# Patient Record
Sex: FEMALE | Race: WHITE | Employment: OTHER | URBAN - METROPOLITAN AREA
[De-identification: names, ages, dates, MRNs, and addresses within clinical notes are randomized per-mention and may not be internally consistent; named-entity substitution may affect disease eponyms.]

---

## 2017-12-06 ENCOUNTER — OFFICE VISIT (OUTPATIENT)
Dept: PULMONOLOGY | Age: 52
End: 2017-12-06

## 2017-12-06 VITALS
DIASTOLIC BLOOD PRESSURE: 86 MMHG | HEIGHT: 67 IN | SYSTOLIC BLOOD PRESSURE: 129 MMHG | RESPIRATION RATE: 18 BRPM | WEIGHT: 148 LBS | HEART RATE: 67 BPM | OXYGEN SATURATION: 99 % | BODY MASS INDEX: 23.23 KG/M2

## 2017-12-06 DIAGNOSIS — R06.02 SOB (SHORTNESS OF BREATH): Primary | ICD-10-CM

## 2017-12-06 DIAGNOSIS — R91.1 PULMONARY NODULE: ICD-10-CM

## 2017-12-06 DIAGNOSIS — J45.50 SEVERE PERSISTENT ASTHMA WITHOUT COMPLICATION: ICD-10-CM

## 2017-12-06 DIAGNOSIS — J44.9 COPD WITH CHRONIC BRONCHITIS (HCC): ICD-10-CM

## 2017-12-06 PROBLEM — J44.89 COPD WITH CHRONIC BRONCHITIS: Status: ACTIVE | Noted: 2017-12-06

## 2017-12-06 PROCEDURE — G8484 FLU IMMUNIZE NO ADMIN: HCPCS | Performed by: INTERNAL MEDICINE

## 2017-12-06 PROCEDURE — G8427 DOCREV CUR MEDS BY ELIG CLIN: HCPCS | Performed by: INTERNAL MEDICINE

## 2017-12-06 PROCEDURE — 3023F SPIROM DOC REV: CPT | Performed by: INTERNAL MEDICINE

## 2017-12-06 PROCEDURE — 3014F SCREEN MAMMO DOC REV: CPT | Performed by: INTERNAL MEDICINE

## 2017-12-06 PROCEDURE — 99204 OFFICE O/P NEW MOD 45 MIN: CPT | Performed by: INTERNAL MEDICINE

## 2017-12-06 PROCEDURE — G8926 SPIRO NO PERF OR DOC: HCPCS | Performed by: INTERNAL MEDICINE

## 2017-12-06 PROCEDURE — 4004F PT TOBACCO SCREEN RCVD TLK: CPT | Performed by: INTERNAL MEDICINE

## 2017-12-06 PROCEDURE — 3017F COLORECTAL CA SCREEN DOC REV: CPT | Performed by: INTERNAL MEDICINE

## 2017-12-06 PROCEDURE — G8420 CALC BMI NORM PARAMETERS: HCPCS | Performed by: INTERNAL MEDICINE

## 2017-12-06 NOTE — PROGRESS NOTES
Shelbi Farias is 47 y.o. female here for Pulmonary Medicine consultation referred by Dr. Cher Prince for evaluation of COPD and SOB. Chief Complaint   Patient presents with    Abnormal CT     NPV - pt had an abnormal CT which showed pulmonary nodule    Shortness of Breath     shortness of breath with exertion since September     Patient was seen 5 years ago for similar evaluation  Symptoms include dry cough and dyspnea on exertion. Symptoms onset gradual over the last few months  Severity described moderate  The course of symptoms gradually worsening since that time. The symptoms improved with rest  The dyspnea occurs with moderate activity. Patient denies hemoptysis, fever, chills, diaphoresis or chest pain . Aggravating factors include exertion, exercise and climbing stairs. The patient reports an exercise tolerance of approximately > 2 blocks on the flat and 2 flights of stairs, limited primarily by dyspnea. Currently the patient admits to SOB worse than baseline. The patient describes normal SOB as dyspnea with exertion. At this time, supplemental 02 is not required    With regard to inhaled therapy, the patient admits to compliance with prescribed inhaled therapy including Symbicort and albuterol    Patient reported hx of asthma as a child requiring hospitalizations multiple times. She grown out of it and she did not have any major respiratory problems while she lived in [place]. She moved to [place] 11 years ago and she was hospitalized twice since for COPD exacerbations. She reported at least 5-6 episodes of respiratory exacerbations since her last visit with me  Her last visit in September was the most severe according to patient and she did have a CTA done  EXAMINATION:   CTA OF THE CHEST 9/14/2017 2:54 pm       TECHNIQUE:   CTA of the chest was performed after the administration of intravenous   contrast.  Multiplanar reformatted images are provided for review.   MIP   images are provided for some air trapping      Past Medical History:   Diagnosis Date    Asthma     Back pain     INTERMITTENT    COPD (chronic obstructive pulmonary disease) (HCC)     Miscarriage     X5    Pneumonia     Pneumonia      Current Outpatient Prescriptions   Medication Sig Dispense Refill    escitalopram (LEXAPRO) 5 MG tablet Take 5 mg by mouth daily 1/2 tab day      buPROPion (WELLBUTRIN) 100 MG tablet Take 100 mg by mouth daily Unknown mg dose      ondansetron (ZOFRAN ODT) 4 MG disintegrating tablet Take 1-2 tablets by mouth every 12 hours as needed for Nausea May Sub regular tablet (non-ODT) if insurance does not cover ODT. 12 tablet 0    naproxen (NAPROSYN) 500 MG tablet Take 1 tablet by mouth 2 times daily for 20 doses 20 tablet 0    montelukast (SINGULAIR) 10 MG tablet Take 10 mg by mouth nightly.  montelukast (SINGULAIR) 10 MG tablet Take 1 tablet by mouth nightly. 30 tablet 0    mometasone (NASONEX) 50 MCG/ACT nasal spray 2 sprays by Nasal route daily.  Budesonide-Formoterol Fumarate (SYMBICORT) 160-4.5 MCG/ACT AERO Inhale 2 puffs into the lungs 2 times daily.  albuterol (PROVENTIL HFA;VENTOLIN HFA) 108 (90 BASE) MCG/ACT inhaler Inhale 2 puffs into the lungs every 6 hours as needed. No current facility-administered medications for this visit. Family History   Problem Relation Age of Onset    Hearing Loss Father      Social History     Social History    Marital status:      Spouse name: N/A    Number of children: N/A    Years of education: N/A     Occupational History    Not on file.      Social History Main Topics    Smoking status: Current Some Day Smoker     Packs/day: 1.00     Years: 35.00     Types: Cigarettes    Smokeless tobacco: Never Used      Comment: started to smoke at 13 / smoked up to 2 p.p.d / now using vapors and cigarettes off and on     Alcohol use Yes      Comment: occ    Drug use: No    Sexual activity: Yes     Partners: Male     Other Topics Concern    Not on file     Social History Narrative    No narrative on file         Review of Systems   Constitutional: Negative. Negative for fever, chills, diaphoresis, activity change, appetite change, fatigue and unexpected weight change. HENT: Negative. Negative for hearing loss, ear pain, nosebleeds, congestion, facial swelling, rhinorrhea, sneezing, neck pain, neck stiffness, postnasal drip, sinus pressure and ear discharge. Eyes: Negative. Negative for photophobia, pain, discharge, redness, itching and visual disturbance. Respiratory: As per HPI  Cardiovascular: Negative. Negative for chest pain, palpitations and leg swelling. Gastrointestinal: Negative. Negative for abdominal pain, blood in stool, abdominal distention and anal bleeding. Genitourinary: Negative. Negative for dysuria, urgency, frequency, hematuria, decreased urine volume, enuresis and difficulty urinating. Musculoskeletal: Negative. Negative for myalgias, back pain, joint swelling, arthralgias and gait problem. Skin: Negative. Negative for color change and pallor. Neurological: Negative. Negative for dizziness, tremors, seizures, syncope, facial asymmetry, speech difficulty, weakness, light-headedness, numbness and headaches. Hematological: Negative. Negative for adenopathy. Psychiatric/Behavioral: Negative. Negative for hallucinations, behavioral problems, confusion and agitation. The patient is not nervous/anxious and is not hyperactive. Blood pressure 129/86, pulse 67, resp. rate 18, height 5' 7\" (1.702 m), weight 148 lb (67.1 kg), SpO2 99 %, unknown if currently breastfeeding. Physical Exam   Constitutional: Oriented. Well-developed and well-nourished. No distress. HENT:   Head: Normocephalic and atraumatic. Mouth/Throat: Oropharynx is clear and moist. No oropharyngeal exudate. Eyes: Conjunctivae and extraocular motions are normal. Pupils are equal, round, and reactive to light.  Right eye exhibits no discharge. Left eye exhibits no discharge. Neck: Normal range of motion. Neck supple. No JVD present. Carotid bruit is not present. No rigidity. No tracheal deviation present. No thyromegaly present. Cardiovascular: Normal rate, regular rhythm, S1&S2 and intact distal pulses. Pulmonary/Chest: Effort normal and breath sounds normal. No stridor. No respiratory distress. Has no wheezes. Has no rhonchi. Has no rales. Exhibits no tenderness. Abdominal: Soft. Bowel sounds are normal. Exhibits no shifting dullness, no distension and no mass. No tenderness. Has no rebound and no guarding. Musculoskeletal: Normal range of motion. Exhibits no edema and no tenderness. Lymphadenopathy:     Has no cervical adenopathy. Has no axillary adenopathy. Neurological: Alert and oriented. Has normal reflexes. No cranial nerve deficit. Exhibits normal muscle tone. Coordination normal.   Skin: Skin is warm and dry. No rash noted. No erythema. Psychiatric: Has a normal mood and affect. Behavior is normal. Thought content normal.      Assessment:    1. SOB (shortness of breath)     2. Severe persistent asthma without complication     3. COPD with chronic bronchitis ([address]     4. Pulmonary nodule                  Plan:    1. I discussed with patient the above diagnosis in details and reviewed her recent ED visits and CAT scan of the chest  2. I reviewed her CAT scan imaging on the monitor and explained findings to the patient. We will repeat CT scan of the chest in 6 month  3. She was educated about inhalers use and we will continue Symbicort 160 twice daily, Albuterol as needed with Singulair. I think she is going required nebulizer treatment at home and we will go ahead and order that  4. Educated again about the need for smoking cessation, including vapors   5. I will order full PFT again, ANCA and IgE levels  6.  RTC in 1 month

## 2017-12-06 NOTE — COMMUNICATION BODY
Assessment:     Assessment:    1. SOB (shortness of breath)     2. Severe persistent asthma without complication     3. COPD with chronic bronchitis ([address]     4. Pulmonary nodule             Plan:        Plan:    1. I discussed with patient the above diagnosis in details and reviewed her recent ED visits and CAT scan of the chest  2. I reviewed her CAT scan imaging on the monitor and explained findings to the patient. We will repeat CT scan of the chest in 6 month  3. She was educated about inhalers use and we will continue Symbicort 160 twice daily, Albuterol as needed with Singulair. I think she is going required nebulizer treatment at home and we will go ahead and order that  4. Educated again about the need for smoking cessation, including vapors   5. I will order full PFT again, ANCA and IgE levels  6.  RTC in 1 month

## 2017-12-14 ENCOUNTER — HOSPITAL ENCOUNTER (OUTPATIENT)
Dept: PULMONOLOGY | Age: 52
Discharge: HOME | End: 2017-12-14
Attending: INTERNAL MEDICINE | Admitting: INTERNAL MEDICINE

## 2017-12-14 VITALS — OXYGEN SATURATION: 99 % | HEART RATE: 76 BPM | RESPIRATION RATE: 18 BRPM

## 2017-12-14 DIAGNOSIS — J45.50 SEVERE PERSISTENT ASTHMA, UNCOMPLICATED: ICD-10-CM

## 2017-12-15 NOTE — PROCEDURES
[facility]                      [facility], [address]                                PULMONARY FUNCTION    PATIENT NAME: Landon Cr                 :        1965  MED REC NO:   [id number]                          ROOM:  ACCOUNT NO:   [de-identified]                          ADMIT DATE: 2017  PROVIDER:     Radha Zelaya MD    DATE OF PROCEDURE:  2017    INDICATION:  Asthma. INTERPRETATION:  Spirometry attempts were acceptable and reproducible. FVC  was normal at 3.69 liters, 95% predicted and normal FEV1 of 2.77 liters,  90% predicted. FEV1/FVC ratio was normal.  Lung volumes showed normal  total lung capacity of 107% predicted. Diffusion capacity showed decreased  DLCO of 72% predicted. IMPRESSION:  Normal spirometry and lung volumes with mild decrease in  diffusion capacity.         J Carlos Fraga MD    D: 12/15/2017 8:09:28       T: 12/15/2017 8:10:10     [email or web address]  Job#: [id number]     Doc#: [id number]    CC:

## 2017-12-16 LAB
ANCA IFA: NORMAL
IGE: 130 KU/L

## 2018-01-17 ENCOUNTER — OFFICE VISIT (OUTPATIENT)
Dept: PULMONOLOGY | Age: 53
End: 2018-01-17

## 2018-01-17 ENCOUNTER — TELEPHONE (OUTPATIENT)
Dept: PULMONOLOGY | Age: 53
End: 2018-01-17

## 2018-01-17 VITALS
BODY MASS INDEX: 23.39 KG/M2 | HEART RATE: 83 BPM | DIASTOLIC BLOOD PRESSURE: 71 MMHG | HEIGHT: 67 IN | SYSTOLIC BLOOD PRESSURE: 107 MMHG | RESPIRATION RATE: 18 BRPM | WEIGHT: 149 LBS | OXYGEN SATURATION: 100 %

## 2018-01-17 DIAGNOSIS — R06.02 SOB (SHORTNESS OF BREATH): ICD-10-CM

## 2018-01-17 DIAGNOSIS — J44.9 COPD WITH ASTHMA (HCC): Primary | ICD-10-CM

## 2018-01-17 DIAGNOSIS — R91.1 PULMONARY NODULE: ICD-10-CM

## 2018-01-17 PROCEDURE — 99214 OFFICE O/P EST MOD 30 MIN: CPT | Performed by: INTERNAL MEDICINE

## 2018-01-17 RX ORDER — DOXYCYCLINE HYCLATE 100 MG
100 TABLET ORAL 2 TIMES DAILY
Qty: 20 TABLET | Refills: 0 | Status: SHIPPED | OUTPATIENT
Start: 2018-01-17 | End: 2018-01-17 | Stop reason: CLARIF

## 2018-01-17 RX ORDER — ALBUTEROL SULFATE 2.5 MG/3ML
2.5 SOLUTION RESPIRATORY (INHALATION) EVERY 6 HOURS PRN
Qty: 120 EACH | Refills: 3 | Status: SHIPPED | OUTPATIENT
Start: 2018-01-17

## 2018-01-17 NOTE — PROGRESS NOTES
Dose modulation, iterative reconstruction,   and/or weight based adjustment of the mA/kV was utilized to reduce the   radiation dose to as low as reasonably achievable.       COMPARISON:   04/03/2014       HISTORY:   ORDERING PHYSICIAN PROVIDED HISTORY: r/o PE   TECHNOLOGIST PROVIDED HISTORY:   Technologist Provided Reason for Exam: for several weeks, coughing up \"black,   red stuff\"   Acuity: Acute   Type of Encounter: Initial   Relevant Medical/Surgical History: copd, asthma, hx pneumonia       FINDINGS:   Pulmonary Arteries: Within the main, central most right, central most left   pulmonary arteries, no evidence of filling defect to suggest large central   pulmonary embolism.  Within the opacified branch vessels, no gross embolism   is seen.       Mediastinum: Within the thoracic aorta, no evidence of intraluminal flap. Aortic pulsation artifact is demonstrated.  Prominent superior pericardial   recess.  Thyroid is heterogeneous.  Bilateral breast implants.  No evidence   of axillary adenopathy.       Lungs/pleura: Emphysema is present.  3 mm right upper lobe pulmonary nodule   on series 2, image 50 is not significantly changed from prior.  Another on   image 90 is unchanged.  Mild bilateral symmetric posterior pleural thickening   is seen, for example on image 116 which is new as compared to prior.  No   evidence of pneumothorax.       Upper Abdomen: 5 mm low-density lesion within the left hepatic lobe is too   small to characterize, though similar to prior.       Soft Tissues/Bones: Degenerative change throughout the spine.           Impression   No gross findings of pulmonary embolism.       Mild nonspecific bilateral posterior pleural thickening.  Continued follow-up   may be obtained based upon clinical risk factors.           She continued to smoke since her last visit and she just switched to taper 6 months ago  She denies any exposure hx and has 2 dogs at home but no cats.   Repeat PFT showed no clear

## 2018-01-18 ENCOUNTER — TELEPHONE (OUTPATIENT)
Dept: PULMONOLOGY | Age: 53
End: 2018-01-18

## 2018-01-19 NOTE — TELEPHONE ENCOUNTER
I spoke to the pt about her Spiriva to tell her not to take it due to being on her Allergy list - pt stated that she could not remember what the reaction was so she decided to try it again - pt stated that so far she has not had any reaction to the Spiriva and she will continue to use it - if pt has a reaction she knows to stop and call the office or go the the ED if the reaction is severe

## 2018-03-07 ENCOUNTER — HOSPITAL ENCOUNTER (OUTPATIENT)
Dept: CT IMAGING | Age: 53
Discharge: HOME | End: 2018-03-07
Attending: INTERNAL MEDICINE | Admitting: INTERNAL MEDICINE

## 2018-03-07 DIAGNOSIS — R91.1 SOLITARY PULMONARY NODULE: ICD-10-CM

## 2018-03-07 DIAGNOSIS — R91.1 PULMONARY NODULE: ICD-10-CM

## 2018-03-15 ENCOUNTER — TELEPHONE (OUTPATIENT)
Dept: PULMONOLOGY | Age: 53
End: 2018-03-15

## 2018-03-15 NOTE — TELEPHONE ENCOUNTER
Pt called in requesting a call to go over the results of her CT Chest from 3/7.     Pt #  [phone]

## 2018-06-01 ENCOUNTER — TELEPHONE (OUTPATIENT)
Dept: PULMONOLOGY | Age: 53
End: 2018-06-01

## 2018-06-01 ENCOUNTER — OFFICE VISIT (OUTPATIENT)
Dept: PULMONOLOGY | Age: 53
End: 2018-06-01

## 2018-06-01 VITALS
BODY MASS INDEX: 21.82 KG/M2 | HEIGHT: 67 IN | HEART RATE: 70 BPM | DIASTOLIC BLOOD PRESSURE: 72 MMHG | RESPIRATION RATE: 18 BRPM | WEIGHT: 139 LBS | OXYGEN SATURATION: 99 % | SYSTOLIC BLOOD PRESSURE: 112 MMHG

## 2018-06-01 DIAGNOSIS — J44.9 COPD WITH ASTHMA (HCC): Primary | ICD-10-CM

## 2018-06-01 DIAGNOSIS — J44.9 ASTHMA WITH COPD (CHRONIC OBSTRUCTIVE PULMONARY DISEASE) (HCC): Primary | ICD-10-CM

## 2018-06-01 PROBLEM — J44.89 COPD WITH ASTHMA: Status: ACTIVE | Noted: 2018-06-01

## 2018-06-01 PROCEDURE — 99213 OFFICE O/P EST LOW 20 MIN: CPT | Performed by: INTERNAL MEDICINE

## 2018-06-08 ENCOUNTER — HOSPITAL ENCOUNTER (OUTPATIENT)
Dept: OTHER | Age: 53
Discharge: HOME | End: 2018-06-08
Attending: INTERNAL MEDICINE | Admitting: INTERNAL MEDICINE

## 2018-12-06 ENCOUNTER — TELEPHONE (OUTPATIENT)
Dept: PULMONOLOGY | Age: 53
End: 2018-12-06

## 2019-09-21 ENCOUNTER — APPOINTMENT (OUTPATIENT)
Dept: GENERAL RADIOLOGY | Age: 54
End: 2019-09-21
Payer: COMMERCIAL

## 2019-09-21 ENCOUNTER — HOSPITAL ENCOUNTER (EMERGENCY)
Age: 54
Discharge: HOME | End: 2019-09-21
Payer: COMMERCIAL

## 2019-09-21 VITALS
OXYGEN SATURATION: 96 % | RESPIRATION RATE: 17 BRPM | WEIGHT: 140 LBS | TEMPERATURE: 98.2 F | HEART RATE: 86 BPM | SYSTOLIC BLOOD PRESSURE: 110 MMHG | BODY MASS INDEX: 21.97 KG/M2 | HEIGHT: 67 IN | DIASTOLIC BLOOD PRESSURE: 68 MMHG

## 2019-09-21 DIAGNOSIS — Z77.098 CHEMICAL EXPOSURE: Primary | ICD-10-CM

## 2019-09-21 DIAGNOSIS — J45.901 EXACERBATION OF ASTHMA, UNSPECIFIED ASTHMA SEVERITY, UNSPECIFIED WHETHER PERSISTENT: ICD-10-CM

## 2019-09-21 PROCEDURE — 94640 AIRWAY INHALATION TREATMENT: CPT

## 2019-09-21 PROCEDURE — 93005 ELECTROCARDIOGRAM TRACING: CPT | Performed by: EMERGENCY MEDICINE

## 2019-09-21 PROCEDURE — 6360000002 HC RX W HCPCS: Performed by: PHYSICIAN ASSISTANT

## 2019-09-21 PROCEDURE — 71046 X-RAY EXAM CHEST 2 VIEWS: CPT

## 2019-09-21 PROCEDURE — 6370000000 HC RX 637 (ALT 250 FOR IP): Performed by: PHYSICIAN ASSISTANT

## 2019-09-21 PROCEDURE — 99284 EMERGENCY DEPT VISIT MOD MDM: CPT

## 2019-09-21 RX ORDER — CETIRIZINE HYDROCHLORIDE 10 MG/1
10 TABLET ORAL DAILY
COMMUNITY

## 2019-09-21 RX ORDER — PREDNISONE 20 MG/1
TABLET ORAL
Qty: 18 TABLET | Refills: 0 | Status: SHIPPED | OUTPATIENT
Start: 2019-09-21 | End: 2019-10-01

## 2019-09-21 RX ORDER — ALBUTEROL SULFATE 2.5 MG/3ML
5 SOLUTION RESPIRATORY (INHALATION) ONCE
Status: DISCONTINUED | OUTPATIENT
Start: 2019-09-21 | End: 2019-09-21

## 2019-09-21 RX ORDER — PREDNISONE 20 MG/1
60 TABLET ORAL ONCE
Status: COMPLETED | OUTPATIENT
Start: 2019-09-21 | End: 2019-09-21

## 2019-09-21 RX ORDER — IPRATROPIUM BROMIDE AND ALBUTEROL SULFATE 2.5; .5 MG/3ML; MG/3ML
1 SOLUTION RESPIRATORY (INHALATION) ONCE
Status: COMPLETED | OUTPATIENT
Start: 2019-09-21 | End: 2019-09-21

## 2019-09-21 RX ADMIN — IPRATROPIUM BROMIDE AND ALBUTEROL SULFATE 1 AMPULE: .5; 3 SOLUTION RESPIRATORY (INHALATION) at 21:06

## 2019-09-21 RX ADMIN — ALBUTEROL SULFATE 5 MG: 2.5 SOLUTION RESPIRATORY (INHALATION) at 21:06

## 2019-09-21 RX ADMIN — PREDNISONE 60 MG: 20 TABLET ORAL at 21:03

## 2019-09-21 ASSESSMENT — ENCOUNTER SYMPTOMS
WHEEZING: 1
COUGH: 1
NAUSEA: 0
CHEST TIGHTNESS: 1
ABDOMINAL PAIN: 0
RHINORRHEA: 0
SHORTNESS OF BREATH: 1
VOMITING: 0
DIARRHEA: 0

## 2019-09-21 ASSESSMENT — PAIN SCALES - GENERAL: PAINLEVEL_OUTOF10: 9

## 2019-09-22 LAB
EKG ATRIAL RATE: 88 BPM
EKG DIAGNOSIS: NORMAL
EKG P AXIS: 72 DEGREES
EKG P-R INTERVAL: 144 MS
EKG Q-T INTERVAL: 376 MS
EKG QRS DURATION: 72 MS
EKG QTC CALCULATION (BAZETT): 454 MS
EKG R AXIS: 64 DEGREES
EKG T AXIS: 22 DEGREES
EKG VENTRICULAR RATE: 88 BPM

## 2019-09-22 PROCEDURE — 93010 ELECTROCARDIOGRAM REPORT: CPT | Performed by: INTERNAL MEDICINE

## 2019-09-22 NOTE — ED PROVIDER NOTES
cardiopulmonary disease. Xr Chest Standard (2 Vw)    Result Date: 9/21/2019  EXAMINATION: TWO XRAY VIEWS OF THE CHEST 9/21/2019 8:21 pm COMPARISON: 09/02/2017 HISTORY: ORDERING SYSTEM PROVIDED HISTORY: shortness of breath TECHNOLOGIST PROVIDED HISTORY: Reason for exam:->shortness of breath Reason for Exam: feels like she has \"chemical pneumonia\". was recently in an apt where they sprayed for bed bugs and has felt fatigued and sob since then. also c/o l flank pain since Acuity: Acute Type of Exam: Initial FINDINGS: The heart and mediastinal structures are stable. The pulmonary vasculature is normal.  Lungs are clear. Calcified bilateral breast implants are redemonstrated. The bones are osteopenic. No acute cardiopulmonary disease. PROCEDURES   Unless otherwise noted below, none     Procedures    CRITICAL CARE TIME   N/A    CONSULTS:  None      EMERGENCY DEPARTMENT COURSE and DIFFERENTIAL DIAGNOSIS/MDM:   Vitals:    Vitals:    09/21/19 2104 09/21/19 2106 09/21/19 2108 09/21/19 2130   BP: 114/72   116/82   Pulse: 80   105   Resp: 16 19 12 23   Temp:       SpO2: 97%  97% 97%   Weight:       Height:           Patient was given thefollowing medications:  Medications   predniSONE (DELTASONE) tablet 60 mg (60 mg Oral Given 9/21/19 2103)   ipratropium-albuterol (DUONEB) nebulizer solution 1 ampule (1 ampule Inhalation Given 9/21/19 2106)   albuterol (PROVENTIL) nebulizer solution 5 mg (5 mg Nebulization Given 9/21/19 2106)       Patient presents for evaluation of cough shortness of breath and wheezing. History of asthma and COPD. On exam, patient is resting comfortably in no acute distress and nontoxic. She was initially slightly tachycardic and tachypneic but vitals otherwise stable and she is afebrile. Satting at 97 and 99% on room air. She does have expiratory wheezing and bronchospastic cough noted on exam with good aeration. Chest is nontender abdomen is benign.   HEENT exam is

## 2019-09-26 ENCOUNTER — HOSPITAL ENCOUNTER (EMERGENCY)
Age: 54
Discharge: HOME | End: 2019-09-26
Attending: EMERGENCY MEDICINE
Payer: COMMERCIAL

## 2019-09-26 ENCOUNTER — APPOINTMENT (OUTPATIENT)
Dept: CT IMAGING | Age: 54
End: 2019-09-26
Payer: COMMERCIAL

## 2019-09-26 VITALS
WEIGHT: 130 LBS | DIASTOLIC BLOOD PRESSURE: 82 MMHG | SYSTOLIC BLOOD PRESSURE: 146 MMHG | BODY MASS INDEX: 20.4 KG/M2 | TEMPERATURE: 99.8 F | OXYGEN SATURATION: 97 % | RESPIRATION RATE: 17 BRPM | HEIGHT: 67 IN | HEART RATE: 79 BPM

## 2019-09-26 DIAGNOSIS — J44.1 COPD EXACERBATION (HCC): Primary | ICD-10-CM

## 2019-09-26 DIAGNOSIS — R05.9 COUGH: ICD-10-CM

## 2019-09-26 LAB
A/G RATIO: 1.6 (ref 1.1–2.2)
ALBUMIN SERPL-MCNC: 4.4 G/DL (ref 3.4–5)
ALP BLD-CCNC: 49 U/L (ref 40–129)
ALT SERPL-CCNC: 13 U/L (ref 10–40)
ANION GAP SERPL CALCULATED.3IONS-SCNC: 11 MMOL/L (ref 3–16)
AST SERPL-CCNC: 13 U/L (ref 15–37)
BASOPHILS ABSOLUTE: 0 K/UL (ref 0–0.2)
BASOPHILS RELATIVE PERCENT: 0.2 %
BILIRUB SERPL-MCNC: 0.4 MG/DL (ref 0–1)
BUN BLDV-MCNC: 14 MG/DL (ref 7–20)
CALCIUM SERPL-MCNC: 9.7 MG/DL (ref 8.3–10.6)
CHLORIDE BLD-SCNC: 104 MMOL/L (ref 99–110)
CO2: 25 MMOL/L (ref 21–32)
CREAT SERPL-MCNC: <0.5 MG/DL (ref 0.6–1.1)
EOSINOPHILS ABSOLUTE: 0 K/UL (ref 0–0.6)
EOSINOPHILS RELATIVE PERCENT: 0.2 %
GFR AFRICAN AMERICAN: >60
GFR NON-AFRICAN AMERICAN: >60
GLOBULIN: 2.7 G/DL
GLUCOSE BLD-MCNC: 111 MG/DL (ref 70–99)
HCG QUALITATIVE: NEGATIVE
HCT VFR BLD CALC: 39.1 % (ref 36–48)
HEMOGLOBIN: 13.3 G/DL (ref 12–16)
LYMPHOCYTES ABSOLUTE: 1.5 K/UL (ref 1–5.1)
LYMPHOCYTES RELATIVE PERCENT: 15.2 %
MCH RBC QN AUTO: 33.8 PG (ref 26–34)
MCHC RBC AUTO-ENTMCNC: 34.2 G/DL (ref 31–36)
MCV RBC AUTO: 99 FL (ref 80–100)
MONOCYTES ABSOLUTE: 0.7 K/UL (ref 0–1.3)
MONOCYTES RELATIVE PERCENT: 7.4 %
NEUTROPHILS ABSOLUTE: 7.8 K/UL (ref 1.7–7.7)
NEUTROPHILS RELATIVE PERCENT: 77 %
PDW BLD-RTO: 13.5 % (ref 12.4–15.4)
PLATELET # BLD: 192 K/UL (ref 135–450)
PMV BLD AUTO: 10.4 FL (ref 5–10.5)
POTASSIUM SERPL-SCNC: 4.1 MMOL/L (ref 3.5–5.1)
PRO-BNP: 77 PG/ML (ref 0–124)
RBC # BLD: 3.95 M/UL (ref 4–5.2)
SODIUM BLD-SCNC: 140 MMOL/L (ref 136–145)
TOTAL PROTEIN: 7.1 G/DL (ref 6.4–8.2)
TROPONIN: <0.01 NG/ML
WBC # BLD: 10.2 K/UL (ref 4–11)

## 2019-09-26 PROCEDURE — 94640 AIRWAY INHALATION TREATMENT: CPT

## 2019-09-26 PROCEDURE — 84703 CHORIONIC GONADOTROPIN ASSAY: CPT

## 2019-09-26 PROCEDURE — 96361 HYDRATE IV INFUSION ADD-ON: CPT

## 2019-09-26 PROCEDURE — 6370000000 HC RX 637 (ALT 250 FOR IP): Performed by: PHYSICIAN ASSISTANT

## 2019-09-26 PROCEDURE — 71250 CT THORAX DX C-: CPT

## 2019-09-26 PROCEDURE — 83880 ASSAY OF NATRIURETIC PEPTIDE: CPT

## 2019-09-26 PROCEDURE — 93005 ELECTROCARDIOGRAM TRACING: CPT | Performed by: EMERGENCY MEDICINE

## 2019-09-26 PROCEDURE — 96375 TX/PRO/DX INJ NEW DRUG ADDON: CPT

## 2019-09-26 PROCEDURE — 96374 THER/PROPH/DIAG INJ IV PUSH: CPT

## 2019-09-26 PROCEDURE — 6360000002 HC RX W HCPCS: Performed by: PHYSICIAN ASSISTANT

## 2019-09-26 PROCEDURE — 84484 ASSAY OF TROPONIN QUANT: CPT

## 2019-09-26 PROCEDURE — 80053 COMPREHEN METABOLIC PANEL: CPT

## 2019-09-26 PROCEDURE — 2580000003 HC RX 258: Performed by: PHYSICIAN ASSISTANT

## 2019-09-26 PROCEDURE — 99285 EMERGENCY DEPT VISIT HI MDM: CPT

## 2019-09-26 PROCEDURE — 85025 COMPLETE CBC W/AUTO DIFF WBC: CPT

## 2019-09-26 PROCEDURE — 94760 N-INVAS EAR/PLS OXIMETRY 1: CPT

## 2019-09-26 RX ORDER — ALBUTEROL SULFATE 2.5 MG/3ML
5 SOLUTION RESPIRATORY (INHALATION) ONCE
Status: COMPLETED | OUTPATIENT
Start: 2019-09-26 | End: 2019-09-26

## 2019-09-26 RX ORDER — 0.9 % SODIUM CHLORIDE 0.9 %
1000 INTRAVENOUS SOLUTION INTRAVENOUS ONCE
Status: COMPLETED | OUTPATIENT
Start: 2019-09-26 | End: 2019-09-26

## 2019-09-26 RX ORDER — METHYLPREDNISOLONE 4 MG/1
TABLET ORAL
Qty: 1 KIT | Refills: 0 | Status: SHIPPED | OUTPATIENT
Start: 2019-09-26 | End: 2019-10-02

## 2019-09-26 RX ORDER — METHYLPREDNISOLONE SODIUM SUCCINATE 125 MG/2ML
125 INJECTION, POWDER, LYOPHILIZED, FOR SOLUTION INTRAMUSCULAR; INTRAVENOUS ONCE
Status: COMPLETED | OUTPATIENT
Start: 2019-09-26 | End: 2019-09-26

## 2019-09-26 RX ORDER — DOXYCYCLINE 100 MG/1
100 TABLET ORAL 2 TIMES DAILY
Qty: 20 TABLET | Refills: 0 | Status: SHIPPED | OUTPATIENT
Start: 2019-09-26 | End: 2019-10-06

## 2019-09-26 RX ORDER — IPRATROPIUM BROMIDE AND ALBUTEROL SULFATE 2.5; .5 MG/3ML; MG/3ML
1 SOLUTION RESPIRATORY (INHALATION) ONCE
Status: COMPLETED | OUTPATIENT
Start: 2019-09-26 | End: 2019-09-26

## 2019-09-26 RX ORDER — KETOROLAC TROMETHAMINE 30 MG/ML
30 INJECTION, SOLUTION INTRAMUSCULAR; INTRAVENOUS ONCE
Status: COMPLETED | OUTPATIENT
Start: 2019-09-26 | End: 2019-09-26

## 2019-09-26 RX ORDER — ALBUTEROL SULFATE 2.5 MG/3ML
2.5 SOLUTION RESPIRATORY (INHALATION) EVERY 6 HOURS PRN
Qty: 120 EACH | Refills: 1 | Status: SHIPPED | OUTPATIENT
Start: 2019-09-26

## 2019-09-26 RX ADMIN — ALBUTEROL SULFATE 5 MG: 2.5 SOLUTION RESPIRATORY (INHALATION) at 19:30

## 2019-09-26 RX ADMIN — SODIUM CHLORIDE 1000 ML: 9 INJECTION, SOLUTION INTRAVENOUS at 19:25

## 2019-09-26 RX ADMIN — KETOROLAC TROMETHAMINE 30 MG: 30 INJECTION, SOLUTION INTRAMUSCULAR at 20:35

## 2019-09-26 RX ADMIN — IPRATROPIUM BROMIDE AND ALBUTEROL SULFATE 1 AMPULE: .5; 3 SOLUTION RESPIRATORY (INHALATION) at 19:30

## 2019-09-26 RX ADMIN — METHYLPREDNISOLONE SODIUM SUCCINATE 125 MG: 125 INJECTION, POWDER, FOR SOLUTION INTRAMUSCULAR; INTRAVENOUS at 19:25

## 2019-09-26 ASSESSMENT — PAIN SCALES - GENERAL
PAINLEVEL_OUTOF10: 9
PAINLEVEL_OUTOF10: 9
PAINLEVEL_OUTOF10: 3

## 2019-09-26 ASSESSMENT — ENCOUNTER SYMPTOMS
SHORTNESS OF BREATH: 1
COUGH: 1
VOMITING: 0
CHEST TIGHTNESS: 1
STRIDOR: 0
RHINORRHEA: 0
DIARRHEA: 0
WHEEZING: 1
NAUSEA: 0
ABDOMINAL PAIN: 0

## 2019-09-26 ASSESSMENT — PAIN DESCRIPTION - PAIN TYPE: TYPE: ACUTE PAIN

## 2019-09-26 ASSESSMENT — PAIN DESCRIPTION - LOCATION: LOCATION: CHEST

## 2019-09-27 LAB
EKG ATRIAL RATE: 88 BPM
EKG DIAGNOSIS: NORMAL
EKG P AXIS: 65 DEGREES
EKG P-R INTERVAL: 136 MS
EKG Q-T INTERVAL: 350 MS
EKG QRS DURATION: 82 MS
EKG QTC CALCULATION (BAZETT): 423 MS
EKG R AXIS: 66 DEGREES
EKG T AXIS: 34 DEGREES
EKG VENTRICULAR RATE: 88 BPM

## 2019-09-27 PROCEDURE — 93010 ELECTROCARDIOGRAM REPORT: CPT | Performed by: INTERNAL MEDICINE

## 2019-09-27 NOTE — ED PROVIDER NOTES
[facility]        Pt Name: Leticia Guzman  MRN: [id number]  [place] 1965  Date of evaluation: 9/26/2019  Provider: Adrien Garnica PA-C  PCP: No primary care provider on file. This patient was seen and evaluated by the attending physician Swapna Rayo MD.      [address] [place] [address]       Chief Complaint   Patient presents with    Shortness of Breath     SOB, COPD, Asthma, wheezing, cant catch her breathe, cant hear, infection, weak started last thursday, chest pain       HISTORY OF PRESENT ILLNESS   (Location/Symptom, Timing/Onset, Context/Setting, Quality, Duration, Modifying Factors, Severity)  Note limiting factors. Leticia Guzman is a 47 y.o. female who presents to the emergency department today for evaluation for shortness of breath and cough. The patient states that she has not been feeling well for the past week. The patient states that she has had a cough which is been productive of sputum. She denies any hemoptysis. The patient states that she has been experiencing some chest tightness and some wheezing for the past week. Patient does have a history of COPD as well as asthma, she states that she continues to smoke. The patient was seen in the emergency room 4 days ago, and she states that she did get breathing treatments as well as steroids, however she was not given antibiotics. The patient states that she continues to have symptoms and she is overall not feeling well, which prompted her visit to the ED. The patient states that over the past 3 days she has had a constant \"chest tightness\" and she believes that this is due to her COPD. The patient denies any abdominal pain. No nausea, vomiting or diarrhea. No urinary symptoms. Denies any fever chills. The patient denies any history of hypertension diabetes or hyperlipidemia. The patient states that her father had an MI in his 46s.   Again she has had thoracic aorta and the main pulmonary artery are of normal size. There are nonenlarged mediastinal and hilar lymph nodes, likely reactive. Lungs/pleura: There is minimal centrilobular emphysema at the lung apices. There is minimal discoid atelectasis at the bilateral lung bases. There is mild bronchial wall thickening, likely related to small airway disease or bronchiolitis. There is no focal consolidation, pleural effusion or pneumothorax. Upper Abdomen: There is no acute abnormality in the visualized upper abdomen. Soft Tissues/Bones: There is no acute abnormality in the soft tissue or osseous structures. The patient is status post bilateral breast augmentations. Mild bronchial wall thickening, likely related to small airway disease or bronchiolitis. No focal consolidation, pleural effusion or pneumothorax. PROCEDURES   Unless otherwise noted below, none     Procedures    CRITICAL CARE TIME   N/A    CONSULTS:  None      EMERGENCY DEPARTMENT COURSE and DIFFERENTIAL DIAGNOSIS/MDM:   Vitals:    Vitals:    09/26/19 1932 09/26/19 2000 09/26/19 2030 09/26/19 2100   BP:  (!) 140/71 132/79 (!) 146/82   Pulse:  96 80 79   Resp: 18 15 20 17   Temp:       TempSrc:       SpO2: 100%   97%   Weight:       Height:           Patient was given thefollowing medications:  Medications   0.9 % sodium chloride bolus (0 mLs Intravenous Stopped 9/26/19 2035)   ipratropium-albuterol (DUONEB) nebulizer solution 1 ampule (1 ampule Inhalation Given 9/26/19 1930)   albuterol (PROVENTIL) nebulizer solution 5 mg (5 mg Nebulization Given 9/26/19 1930)   methylPREDNISolone sodium (SOLU-MEDROL) injection 125 mg (125 mg Intravenous Given 9/26/19 1925)   ketorolac (TORADOL) injection 30 mg (30 mg Intravenous Given 9/26/19 2035)       The patient  presents to the emergency department today for evaluation for shortness of breath and cough. The patient states that she has not been feeling well for the past week.   The patient states DANA  09/26/19 2154

## 2019-09-27 NOTE — ED PROVIDER NOTES
[facility] Emergency Department      Pt Name: Julio César Bello  MRN: [id number]  [place] 1965  Date of evaluation: 2019  Provider: Sravan Garvin MD  I independently performed a history and physical on Renee Dudley. All diagnostic, treatment, and disposition decisions were made by myself in conjunction with the advanced practice provider. HPI: Julio César Bello presented with   Chief Complaint   Patient presents with    Shortness of Breath     SOB, COPD, Asthma, wheezing, cant catch her breathe, cant hear, infection, weak started last thursday, chest pain     Julio César Bello has a past medical history of Asthma, Back pain, COPD (chronic obstructive pulmonary disease) ([facility] [address] Miscarriage, Pneumonia, and Pneumonia. She has a past surgical history that includes Dilation and curettage of uterus;  section; Induced ; Breast surgery; [place] tooth extraction; and Dilation and curettage of uterus (4/14/15). No current facility-administered medications on file prior to encounter. Current Outpatient Medications on File Prior to Encounter   Medication Sig Dispense Refill    cetirizine (ZYRTEC) 10 MG tablet Take 10 mg by mouth daily      albuterol (PROVENTIL) (5 MG/ML) 0.5% nebulizer solution Take 0.5 mLs by nebulization every 6 hours as needed for Wheezing 30 vial 0    predniSONE (DELTASONE) 20 MG tablet 3 tabs po qam for 3 days then 2 tabs qam for 3 days the 1 tab qam for 3 days 18 tablet 0    albuterol (PROVENTIL) (2.5 MG/3ML) 0.083% nebulizer solution Take 3 mLs by nebulization every 6 hours as needed for Wheezing Dx: COPD   ICD-10: J44.9 120 each 3    tiotropium (SPIRIVA RESPIMAT) 2.5 MCG/ACT AERS inhaler Inhale 2 puffs into the lungs daily 1 Inhaler 0    montelukast (SINGULAIR) 10 MG tablet Take 10 mg by mouth nightly.  mometasone (NASONEX) 50 MCG/ACT nasal spray 2 sprays by Nasal route daily.         Budesonide-Formoterol Fumarate (SYMBICORT) 160-4.5

## 2019-10-14 ENCOUNTER — OFFICE VISIT (OUTPATIENT)
Dept: PULMONOLOGY | Age: 54
End: 2019-10-14
Payer: COMMERCIAL

## 2019-10-14 VITALS
SYSTOLIC BLOOD PRESSURE: 138 MMHG | DIASTOLIC BLOOD PRESSURE: 88 MMHG | BODY MASS INDEX: 21.14 KG/M2 | OXYGEN SATURATION: 99 % | HEART RATE: 100 BPM | WEIGHT: 135 LBS

## 2019-10-14 DIAGNOSIS — F17.200 CURRENT SMOKER: ICD-10-CM

## 2019-10-14 DIAGNOSIS — R05.9 COUGH: ICD-10-CM

## 2019-10-14 DIAGNOSIS — R06.02 SOB (SHORTNESS OF BREATH): Primary | ICD-10-CM

## 2019-10-14 DIAGNOSIS — J44.9 COPD WITH ASTHMA (HCC): ICD-10-CM

## 2019-10-14 PROCEDURE — 99214 OFFICE O/P EST MOD 30 MIN: CPT | Performed by: INTERNAL MEDICINE

## 2019-10-14 RX ORDER — AZITHROMYCIN 250 MG/1
250 TABLET, FILM COATED ORAL SEE ADMIN INSTRUCTIONS
Qty: 30 TABLET | Refills: 0 | Status: SHIPPED | OUTPATIENT
Start: 2019-10-14 | End: 2019-11-13

## 2019-10-14 RX ORDER — VARENICLINE TARTRATE 1 MG/1
1 TABLET, FILM COATED ORAL 2 TIMES DAILY
Qty: 60 TABLET | Refills: 2 | Status: SHIPPED | OUTPATIENT
Start: 2019-10-14 | End: 2019-11-06 | Stop reason: SDUPTHER

## 2019-10-14 RX ORDER — VARENICLINE TARTRATE 25 MG
KIT ORAL
Qty: 60 TABLET | Refills: 0 | Status: SHIPPED | OUTPATIENT
Start: 2019-10-14 | End: 2021-09-14

## 2019-10-14 RX ORDER — PREDNISONE 10 MG/1
TABLET ORAL
Qty: 30 TABLET | Refills: 1 | Status: SHIPPED | OUTPATIENT
Start: 2019-10-14 | End: 2019-12-18

## 2019-11-06 RX ORDER — VARENICLINE TARTRATE 1 MG/1
TABLET, FILM COATED ORAL
Qty: 56 TABLET | Refills: 3 | Status: SHIPPED | OUTPATIENT
Start: 2019-11-06 | End: 2021-09-14 | Stop reason: ALTCHOICE

## 2019-11-07 ENCOUNTER — OFFICE VISIT (OUTPATIENT)
Dept: PULMONOLOGY | Age: 54
End: 2019-11-07
Payer: COMMERCIAL

## 2019-11-07 VITALS
HEIGHT: 67 IN | OXYGEN SATURATION: 100 % | SYSTOLIC BLOOD PRESSURE: 133 MMHG | BODY MASS INDEX: 22.44 KG/M2 | DIASTOLIC BLOOD PRESSURE: 75 MMHG | RESPIRATION RATE: 18 BRPM | WEIGHT: 143 LBS | HEART RATE: 63 BPM

## 2019-11-07 DIAGNOSIS — F17.200 CURRENT SMOKER: ICD-10-CM

## 2019-11-07 DIAGNOSIS — R05.9 COUGH: ICD-10-CM

## 2019-11-07 DIAGNOSIS — J44.9 COPD WITH ASTHMA (HCC): Primary | ICD-10-CM

## 2019-11-07 PROCEDURE — 99214 OFFICE O/P EST MOD 30 MIN: CPT | Performed by: NURSE PRACTITIONER

## 2019-11-07 ASSESSMENT — ENCOUNTER SYMPTOMS
CONSTIPATION: 0
SHORTNESS OF BREATH: 1
ABDOMINAL PAIN: 0
COLOR CHANGE: 0
COUGH: 1

## 2019-11-13 PROBLEM — R05.9 COUGH: Status: RESOLVED | Noted: 2019-10-14 | Resolved: 2019-11-13

## 2019-12-02 ENCOUNTER — TELEPHONE (OUTPATIENT)
Dept: PULMONOLOGY | Age: 54
End: 2019-12-02

## 2019-12-18 RX ORDER — PREDNISONE 10 MG/1
TABLET ORAL
Qty: 30 TABLET | Refills: 1 | Status: SHIPPED | OUTPATIENT
Start: 2019-12-18 | End: 2021-09-14

## 2020-09-17 ENCOUNTER — APPOINTMENT (OUTPATIENT)
Dept: CT IMAGING | Age: 55
End: 2020-09-17
Payer: COMMERCIAL

## 2020-09-17 ENCOUNTER — HOSPITAL ENCOUNTER (EMERGENCY)
Age: 55
Discharge: HOME | End: 2020-09-18
Payer: COMMERCIAL

## 2020-09-17 LAB
A/G RATIO: 1.9 (ref 1.1–2.2)
ALBUMIN SERPL-MCNC: 4.5 G/DL (ref 3.4–5)
ALP BLD-CCNC: 45 U/L (ref 40–129)
ALT SERPL-CCNC: 17 U/L (ref 10–40)
ANION GAP SERPL CALCULATED.3IONS-SCNC: 9 MMOL/L (ref 3–16)
AST SERPL-CCNC: 16 U/L (ref 15–37)
BASOPHILS ABSOLUTE: 0 K/UL (ref 0–0.2)
BASOPHILS RELATIVE PERCENT: 0.3 %
BILIRUB SERPL-MCNC: 0.3 MG/DL (ref 0–1)
BUN BLDV-MCNC: 11 MG/DL (ref 7–20)
CALCIUM SERPL-MCNC: 10 MG/DL (ref 8.3–10.6)
CHLORIDE BLD-SCNC: 104 MMOL/L (ref 99–110)
CO2: 24 MMOL/L (ref 21–32)
CREAT SERPL-MCNC: <0.5 MG/DL (ref 0.6–1.1)
EOSINOPHILS ABSOLUTE: 0 K/UL (ref 0–0.6)
EOSINOPHILS RELATIVE PERCENT: 0 %
GFR AFRICAN AMERICAN: >60
GFR NON-AFRICAN AMERICAN: >60
GLOBULIN: 2.4 G/DL
GLUCOSE BLD-MCNC: 192 MG/DL (ref 70–99)
HCT VFR BLD CALC: 42.1 % (ref 36–48)
HEMOGLOBIN: 14 G/DL (ref 12–16)
LYMPHOCYTES ABSOLUTE: 0.5 K/UL (ref 1–5.1)
LYMPHOCYTES RELATIVE PERCENT: 7.8 %
MCH RBC QN AUTO: 32.8 PG (ref 26–34)
MCHC RBC AUTO-ENTMCNC: 33.2 G/DL (ref 31–36)
MCV RBC AUTO: 98.7 FL (ref 80–100)
MONOCYTES ABSOLUTE: 0 K/UL (ref 0–1.3)
MONOCYTES RELATIVE PERCENT: 0.7 %
NEUTROPHILS ABSOLUTE: 5.3 K/UL (ref 1.7–7.7)
NEUTROPHILS RELATIVE PERCENT: 91.2 %
PDW BLD-RTO: 14.4 % (ref 12.4–15.4)
PLATELET # BLD: 175 K/UL (ref 135–450)
PMV BLD AUTO: 10.1 FL (ref 5–10.5)
POTASSIUM REFLEX MAGNESIUM: 4 MMOL/L (ref 3.5–5.1)
RBC # BLD: 4.27 M/UL (ref 4–5.2)
SODIUM BLD-SCNC: 137 MMOL/L (ref 136–145)
TOTAL PROTEIN: 6.9 G/DL (ref 6.4–8.2)
WBC # BLD: 5.8 K/UL (ref 4–11)

## 2020-09-17 PROCEDURE — 85025 COMPLETE CBC W/AUTO DIFF WBC: CPT

## 2020-09-17 PROCEDURE — 80053 COMPREHEN METABOLIC PANEL: CPT

## 2020-09-17 PROCEDURE — 99283 EMERGENCY DEPT VISIT LOW MDM: CPT

## 2020-09-17 ASSESSMENT — PAIN SCALES - GENERAL: PAINLEVEL_OUTOF10: 10

## 2020-09-18 ENCOUNTER — APPOINTMENT (OUTPATIENT)
Dept: CT IMAGING | Age: 55
End: 2020-09-18
Payer: COMMERCIAL

## 2020-09-18 VITALS
RESPIRATION RATE: 18 BRPM | WEIGHT: 143 LBS | TEMPERATURE: 96.5 F | DIASTOLIC BLOOD PRESSURE: 68 MMHG | HEART RATE: 91 BPM | SYSTOLIC BLOOD PRESSURE: 113 MMHG | OXYGEN SATURATION: 97 % | HEIGHT: 67 IN | BODY MASS INDEX: 22.44 KG/M2

## 2020-09-18 PROCEDURE — 96375 TX/PRO/DX INJ NEW DRUG ADDON: CPT

## 2020-09-18 PROCEDURE — 96374 THER/PROPH/DIAG INJ IV PUSH: CPT

## 2020-09-18 PROCEDURE — 6360000004 HC RX CONTRAST MEDICATION: Performed by: PHYSICIAN ASSISTANT

## 2020-09-18 PROCEDURE — 71260 CT THORAX DX C+: CPT

## 2020-09-18 PROCEDURE — 6360000002 HC RX W HCPCS: Performed by: PHYSICIAN ASSISTANT

## 2020-09-18 RX ORDER — HYDROCODONE BITARTRATE AND ACETAMINOPHEN 5; 325 MG/1; MG/1
1 TABLET ORAL EVERY 8 HOURS PRN
Qty: 9 TABLET | Refills: 0 | Status: SHIPPED | OUTPATIENT
Start: 2020-09-18 | End: 2020-09-18 | Stop reason: SDUPTHER

## 2020-09-18 RX ORDER — CYCLOBENZAPRINE HCL 10 MG
5 TABLET ORAL 2 TIMES DAILY PRN
Qty: 20 TABLET | Refills: 0 | Status: SHIPPED | OUTPATIENT
Start: 2020-09-18 | End: 2020-09-25

## 2020-09-18 RX ORDER — IBUPROFEN 800 MG/1
800 TABLET ORAL EVERY 8 HOURS PRN
Qty: 30 TABLET | Refills: 0 | Status: SHIPPED | OUTPATIENT
Start: 2020-09-18 | End: 2021-09-14

## 2020-09-18 RX ORDER — ONDANSETRON 2 MG/ML
4 INJECTION INTRAMUSCULAR; INTRAVENOUS ONCE
Status: COMPLETED | OUTPATIENT
Start: 2020-09-18 | End: 2020-09-18

## 2020-09-18 RX ORDER — HYDROCODONE BITARTRATE AND ACETAMINOPHEN 5; 325 MG/1; MG/1
1 TABLET ORAL EVERY 8 HOURS PRN
Qty: 9 TABLET | Refills: 0 | Status: SHIPPED | OUTPATIENT
Start: 2020-09-18 | End: 2020-09-21

## 2020-09-18 RX ORDER — MORPHINE SULFATE 4 MG/ML
4 INJECTION, SOLUTION INTRAMUSCULAR; INTRAVENOUS ONCE
Status: COMPLETED | OUTPATIENT
Start: 2020-09-18 | End: 2020-09-18

## 2020-09-18 RX ADMIN — IOPAMIDOL 75 ML: 755 INJECTION, SOLUTION INTRAVENOUS at 00:05

## 2020-09-18 RX ADMIN — MORPHINE SULFATE 4 MG: 4 INJECTION INTRAVENOUS at 00:53

## 2020-09-18 RX ADMIN — ONDANSETRON 4 MG: 2 INJECTION INTRAMUSCULAR; INTRAVENOUS at 00:53

## 2020-09-18 ASSESSMENT — ENCOUNTER SYMPTOMS
WHEEZING: 0
SHORTNESS OF BREATH: 0
VOMITING: 0
NAUSEA: 0
ABDOMINAL PAIN: 1

## 2020-09-18 ASSESSMENT — PAIN SCALES - GENERAL: PAINLEVEL_OUTOF10: 10

## 2020-09-18 NOTE — ED NOTES
Nursing Discharge Notes:  -Patient discharged at this time in no acute distress after verbalizing understanding of discharge instructions.  -A copy of the AVS was reviewed with pt and family.  -Pt received applicable scripts which were reviewed with pt and family by this RN. -Pt was given the opportunity to ask questions before signing for discharge. -IV removed and dressing applied.    -Pt left via wheelchair to lobby / discharge area. Patient Education:  Learner - Patient and family. Motivation and Readiness To Learn - Medium to High  Barriers To Learning - None  Learning Preference / Provided Instructions - Both written and verbal discharge instructions.        Greyson King RN  09/18/20 [id number]

## 2020-09-18 NOTE — ED PROVIDER NOTES
[facility]        Pt Name: Gallito Robbins  MRN: [id number]  [place] 1965  Date of evaluation: 9/17/2020  Provider: Oksana Slade  PCP: No primary care provider on file. ALECIA. I have evaluated this patient. My supervising physician was available for consultation. CHIEF COMPLAINT       Chief Complaint   Patient presents with    Other     Pt with rib pain from trauma that occured Saturday night, was seen at urgent care and sent home with vicodin, didn't help the pain was seen at Replaced by [facility] today with x ray no fractures. Pt states that pain is persistant. HISTORY OF PRESENT ILLNESS   (Location, Timing/Onset, Context/Setting, Quality, Duration, Modifying Factors, Severity, Associated Signs and Symptoms)  Note limiting factors. Gallito Robbins is a 49 y.o. female patient presents emergency department for evaluation of left-sided lower rib pain from a trauma that occurred on Saturday. Patient states she was moving a piece of wood in her yard when it hit her forcefully in the left-sided chest.  Patient states she saw urgent care on the 15th who told her it was not broken on x-ray and gave her Norco's for pain. Patient states her pain got worse throughout the week and she was evaluated at [address] earlier today, x-rays were performed she was diagnosed with a chest wall contusion and given prednisone. Patient states she is concerned about internal organ damage is the pain also is in her left upper quadrant abdomen. She is concerned that the pain has gotten worse over the week instead of better. Patient states the pain is constant but gets worse with any deep breathing or movement. Patient states she was given medication at [facility] for the pain but states it did not really help her. Patient denies any other injuries. She denies any heart palpitations or shortness of breath.   Denies any difficulty breathing or swallowing. Denies any recent surgery or estrogen containing birth control pills. Patient has a history of asthma and COPD but denies any wheezing or shortness of breath. Nursing Notes were all reviewed and agreed with or any disagreements were addressed in the HPI. REVIEW OF SYSTEMS    (2-9 systems for level 4, 10 or more for level 5)     Review of Systems   Constitutional: Negative for fatigue and fever. HENT: Negative. Eyes: Negative for visual disturbance. Respiratory: Negative for shortness of breath and wheezing. Cardiovascular: Negative for chest pain and palpitations. Gastrointestinal: Positive for abdominal pain. Negative for nausea and vomiting. Genitourinary: Negative. Musculoskeletal: Positive for arthralgias (left rib). Skin: Negative. Neurological: Negative. Positives and Pertinent negatives as per HPI. Except as noted above in the ROS, all other systems were reviewed and negative. PAST MEDICAL HISTORY     Past Medical History:   Diagnosis Date    Asthma     Back pain     INTERMITTENT    COPD (chronic obstructive pulmonary disease) ([facility] [address]     Miscarriage     X5    Pneumonia     Pneumonia          SURGICAL HISTORY     Past Surgical History:   Procedure Laterality Date    BREAST SURGERY      augmentation     SECTION      DILATION AND CURETTAGE OF UTERUS      DILATION AND CURETTAGE OF UTERUS  4/14/15    suction    INDUCED       WISDOM TOOTH EXTRACTION           CURRENTMEDICATIONS       Discharge Medication List as of 2020  1:51 AM      CONTINUE these medications which have NOT CHANGED    Details   cetirizine (ZYRTEC) 10 MG tablet Take 10 mg by mouth dailyHistorical Med      buPROPion (WELLBUTRIN) 100 MG tablet Take 100 mg by mouth daily Unknown mg doseHistorical Med      mometasone (NASONEX) 50 MCG/ACT nasal spray 2 sprays by Nasal route daily.   , Nasal, DAILY, Until Discontinued, Historical Med Budesonide-Formoterol Fumarate (SYMBICORT) 160-4.5 MCG/ACT AERO Inhale 2 puffs into the lungs 2 times daily. predniSONE (DELTASONE) 10 MG tablet 2 TABLETS FOR 7 DAYS AND THEN ONE TABLET FOR 21 DAYS, Disp-30 tablet,R-1Normal      CHANTIX 1 MG tablet TAKE 1 TABLET BY MOUTH TWICE A DAY, Disp-56 tablet, R-3Normal      varenicline (CHANTIX STARTING MONTH JOSTIN) 0.5 MG X 11 & 1 MG X 42 tablet By mouth., Disp-60 tablet, R-0Normal      !! albuterol (PROVENTIL) (2.5 MG/3ML) 0.083% nebulizer solution Take 3 mLs by nebulization every 6 hours as needed for Wheezing, Disp-120 each, R-1Print      albuterol (PROVENTIL) (5 MG/ML) 0.5% nebulizer solution Take 0.5 mLs by nebulization every 6 hours as needed for Wheezing, Disp-30 vial, R-0Print      !! albuterol (PROVENTIL) (2.5 MG/3ML) 0.083% nebulizer solution Take 3 mLs by nebulization every 6 hours as needed for Wheezing Dx: COPD   ICD-10: J44.9, Disp-120 each, R-3Normal      escitalopram (LEXAPRO) 5 MG tablet Take 5 mg by mouth daily 1/2 tab dayHistorical Med      ondansetron (ZOFRAN ODT) 4 MG disintegrating tablet Take 1-2 tablets by mouth every 12 hours as needed for Nausea May Sub regular tablet (non-ODT) if insurance does not cover ODT., Disp-12 tablet, R-0Print      naproxen (NAPROSYN) 500 MG tablet Take 1 tablet by mouth 2 times daily for 20 doses, Disp-20 tablet, R-0Print      montelukast (SINGULAIR) 10 MG tablet Take 10 mg by mouth nightly. Historical Med      montelukast (SINGULAIR) 10 MG tablet Take 1 tablet by mouth nightly., Disp-30 tablet, R-0      albuterol (PROVENTIL HFA;VENTOLIN HFA) 108 (90 BASE) MCG/ACT inhaler Inhale 2 puffs into the lungs every 6 hours as needed. !! - Potential duplicate medications found. Please discuss with provider. ALLERGIES     Chantix [varenicline tartrate]; Codeine;  Advair [fluticasone-salmeterol]; and Spiriva [tiotropium bromide monohydrate]    FAMILYHISTORY       Family History   Problem Relation Age of Onset    Hearing Loss Father           SOCIAL HISTORY       Social History     Tobacco Use    Smoking status: Current Some Day Smoker     Packs/day: 2.00     Years: 35.00     Pack years: 70.00     Types: Cigarettes    Smokeless tobacco: Never Used    Tobacco comment: started to smoke at 13 / smoked up to 2 p.p.d / now  smoking 4 cigarettes a day   Substance Use Topics    Alcohol use: Yes     Comment: occ    Drug use: No       SCREENINGS             PHYSICAL EXAM    (up to 7 for level 4, 8 or more for level 5)     ED Triage Vitals [09/17/20 2040]   BP Temp Temp src Pulse Resp SpO2 Height Weight   (!) 163/74 96.5 °F (35.8 °C) -- 91 18 97 % 5' 7\" (1.702 m) 143 lb (64.9 kg)       Physical Exam  Vitals signs and nursing note reviewed. Constitutional:       General: She is not in acute distress. Appearance: Normal appearance. She is well-developed. She is not ill-appearing, toxic-appearing or diaphoretic. HENT:      Head: Normocephalic and atraumatic. Nose: Nose normal.      Mouth/Throat:      Mouth: Mucous membranes are moist.      Pharynx: Oropharynx is clear. Eyes:      General:         Right eye: No discharge. Left eye: No discharge. Conjunctiva/sclera: Conjunctivae normal.      Pupils: Pupils are equal, round, and reactive to light. Neck:      Musculoskeletal: Normal range of motion and neck supple. Cardiovascular:      Rate and Rhythm: Normal rate and regular rhythm. Heart sounds: Normal heart sounds. No murmur. No gallop. Pulmonary:      Effort: Pulmonary effort is normal. No respiratory distress. Breath sounds: Normal breath sounds. No wheezing, rhonchi or rales. Abdominal:      General: Bowel sounds are normal.      Tenderness: There is abdominal tenderness in the left upper quadrant. There is no guarding or rebound. Musculoskeletal: Normal range of motion. General: No swelling or tenderness. Skin:     General: Skin is warm and dry.       Capillary Refill: of the chest, abdomen and pelvis was performed with the administration of intravenous contrast. Multiplanar reformatted images are provided for review. Dose modulation, iterative reconstruction, and/or weight based adjustment of the mA/kV was utilized to reduce the radiation dose to as low as reasonably achievable. COMPARISON: 09/26/2019 HISTORY: ORDERING SYSTEM PROVIDED HISTORY: left lower anterior rib, luq pain, trauma on saturday to left chest wall TECHNOLOGIST PROVIDED HISTORY: Reason for exam:->left lower anterior rib, luq pain, trauma on saturday to left chest wall Additional Contrast?->None Reason for Exam: left lower anterior rib, luq pain, trauma on saturday to left chest wall Acuity: Acute Type of Exam: Initial Mechanism of Injury: fall FINDINGS: Chest: Mediastinum: Thoracic aorta is normal in caliber with homogeneous enhancement. Proximal great vessels are unremarkable. The heart size is normal.  No mediastinal or hilar adenopathy. Lungs/pleura: There are few small bands of bibasilar atelectasis. Lungs are otherwise clear. No pneumothorax or pleural fluid. Soft Tissues/Bones: Bilateral breast implants with densely calcified capsules. No acute bone finding. Abdomen/Pelvis: Organs: The liver, spleen, pancreas, adrenal glands and kidneys are normal. There is a 1 cm liver cyst.  There are no calcified gallstones. No pericholecystic fluid. GI/Bowel: No evidence of an acute bowel injury. Unopacified bowel loops are unremarkable. The appendix is normal. Pelvis: There is a 3.1 cm right-sided uterine fibroid. Uterus, ovaries and urinary bladder are otherwise unremarkable. Peritoneum/Retroperitoneum: There is no adenopathy or free air. There is trace free low-attenuation fluid in the right cul-de-sac. There is a circumaortic left renal vein. Bones/Soft Tissues: Degenerative disc disease at L5-S1. No acute bone finding. No evidence of an acute injury in the chest, abdomen or pelvis.  Bilateral breast care doctor on Monday as previously scheduled. Patient was also encouraged to take it easy over the weekend. She was given an incentive spirometer by nursing staff and I had a lengthy conversation about her utilizing this every few hours to ensure she gets good full deep breaths. Patient expresses understanding of this. Patient is discharged home in good condition to follow-up with primary care on Monday. I see nothing that would suggest an acute abdomen at this time. Based on history, physical exam, risk factors, and tests my suspicion for acute bony fracture, PE,  bowel obstruction, incarcerated hernia, acute pancreatitis, intra-abdominal abscess, perforated viscus, diverticulitis, cholecystitis, appendicitis, PID, ovarian torsion, ectopic pregnancy and tubo-ovarian abscess is very low. There is no evidence of peritonitis, sepsis or toxicity at this time. I feel the patient can be managed as an outpatient with follow-up with her family doctor in 24-48 hours. Instructions have been given for the patient to return to the ED for worsening of the pain, high fevers, intractable vomiting, or bleeding. FINAL IMPRESSION      1. Contusion of left chest wall, subsequent encounter          DISPOSITION/PLAN   DISPOSITION Decision To Discharge 09/18/2020 01:39:35 AM      PATIENT REFERREDTO:  No follow-up provider specified.     DISCHARGE MEDICATIONS:  Discharge Medication List as of 9/18/2020  1:51 AM      START taking these medications    Details   ibuprofen (ADVIL;MOTRIN) 800 MG tablet Take 1 tablet by mouth every 8 hours as needed for Pain, Disp-30 tablet,R-0Print      cyclobenzaprine (FLEXERIL) 10 MG tablet Take 0.5 tablets by mouth 2 times daily as needed for Muscle spasms, Disp-20 tablet,R-0Print             DISCONTINUED MEDICATIONS:  Discharge Medication List as of 9/18/2020  1:51 AM                 (Please note that portions of this note were completed with a voice recognition program.  Efforts were made to edit the dictations but occasionally words are mis-transcribed.)    Juan Armenta PA-C (electronically signed)            Juan Armenta PA-C  09/18/20 [address]  09/18/20 [id number]

## 2020-11-13 LAB
HPV COMMENT: NORMAL
HPV TYPE 16: NOT DETECTED
HPV TYPE 18: NOT DETECTED
HPVOH (OTHER TYPES): NOT DETECTED

## 2021-03-18 LAB
CANDIDA SPECIES, DNA PROBE: ABNORMAL
GARDNERELLA VAGINALIS, DNA PROBE: ABNORMAL
TRICHOMONAS VAGINALIS DNA: ABNORMAL

## 2021-06-30 ENCOUNTER — TELEPHONE (OUTPATIENT)
Dept: PULMONOLOGY | Age: 56
End: 2021-06-30

## 2021-07-01 RX ORDER — ALBUTEROL SULFATE 2.5 MG/3ML
2.5 SOLUTION RESPIRATORY (INHALATION) EVERY 6 HOURS PRN
Qty: 120 EACH | Refills: 3 | Status: SHIPPED | OUTPATIENT
Start: 2021-07-01

## 2021-09-14 ENCOUNTER — OFFICE VISIT (OUTPATIENT)
Dept: PULMONOLOGY | Age: 56
End: 2021-09-14
Payer: COMMERCIAL

## 2021-09-14 VITALS — HEART RATE: 70 BPM | OXYGEN SATURATION: 98 %

## 2021-09-14 DIAGNOSIS — J44.9 COPD WITH ASTHMA (HCC): ICD-10-CM

## 2021-09-14 DIAGNOSIS — R05.3 CHRONIC COUGH: ICD-10-CM

## 2021-09-14 DIAGNOSIS — F17.200 CURRENT SMOKER: ICD-10-CM

## 2021-09-14 DIAGNOSIS — R06.02 SOB (SHORTNESS OF BREATH): Primary | ICD-10-CM

## 2021-09-14 PROCEDURE — 99214 OFFICE O/P EST MOD 30 MIN: CPT | Performed by: INTERNAL MEDICINE

## 2021-09-14 RX ORDER — PREDNISONE 10 MG/1
TABLET ORAL
Qty: 35 TABLET | Refills: 0 | Status: SHIPPED
Start: 2021-09-14 | End: 2021-09-14 | Stop reason: CLARIF

## 2021-09-14 RX ORDER — AZITHROMYCIN 250 MG/1
250 TABLET, FILM COATED ORAL DAILY
Qty: 30 TABLET | Refills: 0 | Status: SHIPPED | OUTPATIENT
Start: 2021-09-14

## 2021-09-14 RX ORDER — PREDNISONE 10 MG/1
TABLET ORAL
Qty: 35 TABLET | Refills: 0 | Status: SHIPPED | OUTPATIENT
Start: 2021-09-14

## 2021-09-14 RX ORDER — AZITHROMYCIN 250 MG/1
250 TABLET, FILM COATED ORAL DAILY
Qty: 30 TABLET | Refills: 0 | Status: SHIPPED
Start: 2021-09-14 | End: 2021-09-14 | Stop reason: CLARIF

## 2021-09-14 NOTE — PROGRESS NOTES
[facility]  Follow Up Note  Mu Landon MD       Polina Owens   YOB: 1965    Date of Visit:  9/14/2021    Assessment/Plan:  1. SOB (shortness of breath) & 2. Cough  She is struggling with a long-lived flare up  CXR was negative 7/21. Have her repeat CXR  Put her on Prednisone 20 mg per day for a week and then 10 mg per day until she comes back  Also put her on Azithromycin 250 mg daily for the next 30 days. 3. COPD with asthma ([address]  Symbicort  Albuterol    4. Current smoker  Still smoking  Needs to quit to reduce her risk of bronchitis and advancing COPD. Discussed NRT as an aid to smoking cessation      Chief Complaint   Patient presents with    Cough     started 4 months ago     Congestion       HPI  The patient presents with a chief complaint of shortness of breath and cough. She was in the ER 7/21 but she is still sick. Similar to previous presentation in 2019 when she was here last.      Review of Systems  No Chest pain, Nausea or vomiting reported    History  I have reviewed past medical, surgical, social and family history. This is documented elsewhere in the medical record. Physical Exam:  Well developed, well nourished  Alert and oriented  Sclera is clear  No cervical adenopathy  No JVD. Chest examination is clear. Cardiac examination reveals regular rate and rhythm without murmur, gallop or rub. The abdomen is soft, nontender and nondistended. There is no clubbing, cyanosis or edema of the extremities. There is no obvious skin rash. No focal neuro deficicts  Normal mood and affect    Allergies   Allergen Reactions    Chantix [Varenicline Tartrate] Other (See Comments)     Burning in her mouth / lips    Codeine Shortness Of Breath    Advair [Fluticasone-Salmeterol] Hives    Spiriva [Tiotropium Bromide Monohydrate] Hives     Prior to Visit Medications    Medication Sig Taking?  Authorizing Provider   albuterol Comment    started to smoke at 17 / smoked up to 2 p.p.d / now  smoking 4 cigarettes a day

## 2022-06-17 ENCOUNTER — OFFICE VISIT (OUTPATIENT)
Dept: PULMONOLOGY | Age: 57
End: 2022-06-17
Payer: COMMERCIAL

## 2022-06-17 VITALS
BODY MASS INDEX: 24.28 KG/M2 | SYSTOLIC BLOOD PRESSURE: 126 MMHG | WEIGHT: 155 LBS | HEART RATE: 87 BPM | DIASTOLIC BLOOD PRESSURE: 72 MMHG | OXYGEN SATURATION: 97 %

## 2022-06-17 DIAGNOSIS — Z87.891 PERSONAL HISTORY OF TOBACCO USE: Primary | ICD-10-CM

## 2022-06-17 DIAGNOSIS — F17.200 CURRENT SMOKER: ICD-10-CM

## 2022-06-17 DIAGNOSIS — J44.9 COPD WITH ASTHMA (HCC): ICD-10-CM

## 2022-06-17 PROCEDURE — 99214 OFFICE O/P EST MOD 30 MIN: CPT | Performed by: INTERNAL MEDICINE

## 2022-06-17 PROCEDURE — G0296 VISIT TO DETERM LDCT ELIG: HCPCS | Performed by: INTERNAL MEDICINE

## 2022-06-17 PROCEDURE — 99407 BEHAV CHNG SMOKING > 10 MIN: CPT | Performed by: INTERNAL MEDICINE

## 2022-06-17 RX ORDER — IPRATROPIUM BROMIDE AND ALBUTEROL SULFATE 2.5; .5 MG/3ML; MG/3ML
1 SOLUTION RESPIRATORY (INHALATION) EVERY 6 HOURS PRN
Qty: 360 ML | Refills: 6 | Status: SHIPPED | OUTPATIENT
Start: 2022-06-17

## 2022-06-17 NOTE — PROGRESS NOTES
PULMONARY OFFICE FOLLOW UP NOTE    REASON FOR VISIT:   Chief Complaint   Patient presents with    Wheezing       DATE OF VISIT: 6/17/2022    HISTORY OF PRESENT ILLNESS: 55y.o. year old female is here for follow-up of COPD/asthma. She is a current everyday smoker. Patient states that she was exposed to her sick grandchild a week ago and has been complaining of increased shortness of breath along with cough and wheezing and sinus congestion. Her primary care physician gave her a course of prednisone as well as antibiotics which she is finishing. Symptoms have almost resolved except some residual shortness of breath and wheezing. She is able to have a conversation with me without having any shortness of breath. Minimal wheezing upon auscultation. Patient has extensive smoking history. She quit smoking for a year in between and then started smoking again up to a pack per day. Her bronchodilator regimen consist of Symbicort and albuterol. She is also using albuterol nebs as needed. Last PFT from 2017 showed normal lung function. REVIEW OF SYSTEMS:    CONSTITUTIONAL SYMPTOMS: The patient denies fever, fatigue, night sweats, weight loss or weight gain. HEENT: No vision changes. No tinnitus, Denies sinus pain. No hoarseness, or dysphagia. NECK: Patient denies swelling in the neck. CARDIOVASCULAR: Denies chest pain, palpitation, syncope. RESPIRATORY: See above. GASTROINTESTINAL: Denies nausea, abdominal pain or change in bowel function. GENITOURINARY: Denies obstructive symptoms. No history of incontinence. BREASTS: No masses or lumps in the breasts. SKIN: No rashes or itching. MUSCULOSKELETAL: Denies weakness or bone pain. NEUROLOGICAL: No headaches or seizures. PSYCHIATRIC: Denies mood swings or depression. ENDOCRINE: Denies heat or cold intolerance or excessive thirst.  HEMATOLOGIC/LYMPHATIC: Denies easy bruising or lymph node swelling.   ALLERGIC/IMMUNOLOGIC: No environmental allergies.     PAST MEDICAL HISTORY:   Past Medical History:   Diagnosis Date    Asthma     Back pain     INTERMITTENT    COPD (chronic obstructive pulmonary disease) ([facility] [address]     Miscarriage     X5    Pneumonia     Pneumonia        PAST SURGICAL HISTORY:   Past Surgical History:   Procedure Laterality Date    BREAST SURGERY      augmentation     SECTION      DILATION AND CURETTAGE OF UTERUS      DILATION AND CURETTAGE OF UTERUS  4/14/15    suction    INDUCED       WISDOM TOOTH EXTRACTION         SOCIAL HISTORY:   Social History     Tobacco Use    Smoking status: Current Some Day Smoker     Packs/day: 2.00     Years: 35.00     Pack years: 70.00     Types: Cigarettes    Smokeless tobacco: Never Used    Tobacco comment: started to smoke at 13 / smoked up to 2 p.p.d / now  smoking 4 cigarettes a day   Vaping Use    Vaping Use: Former    Substances: Always   Substance Use Topics    Alcohol use: Yes     Comment: occ    Drug use: No       FAMILY HISTORY:   Family History   Problem Relation Age of Onset    Hearing Loss Father        MEDICATIONS:     Current Outpatient Medications on File Prior to Visit   Medication Sig Dispense Refill    predniSONE (DELTASONE) 10 MG tablet 20 mg a day x 7 days then 10 mg a day for 21 days 35 tablet 0    azithromycin (ZITHROMAX) 250 MG tablet Take 1 tablet by mouth daily 30 tablet 0    albuterol (PROVENTIL) (2.5 MG/3ML) 0.083% nebulizer solution Take 3 mLs by nebulization every 6 hours as needed for Wheezing 120 each 3    albuterol (PROVENTIL) (2.5 MG/3ML) 0.083% nebulizer solution Take 3 mLs by nebulization every 6 hours as needed for Wheezing 120 each 1    cetirizine (ZYRTEC) 10 MG tablet Take 10 mg by mouth daily      albuterol (PROVENTIL) (5 MG/ML) 0.5% nebulizer solution Take 0.5 mLs by nebulization every 6 hours as needed for Wheezing 30 vial 0    albuterol (PROVENTIL) (2.5 MG/3ML) 0.083% nebulizer solution Take 3 mLs by nebulization every 6 hours as needed for Wheezing Dx: COPD   ICD-10: J44.9 120 each 3    buPROPion (WELLBUTRIN) 100 MG tablet Take 100 mg by mouth 2 times daily Unknown mg dose       Budesonide-Formoterol Fumarate (SYMBICORT) 160-4.5 MCG/ACT AERO Inhale 2 puffs into the lungs 2 times daily.  albuterol (PROVENTIL HFA;VENTOLIN HFA) 108 (90 BASE) MCG/ACT inhaler Inhale 2 puffs into the lungs every 6 hours as needed.  naproxen (NAPROSYN) 500 MG tablet Take 1 tablet by mouth 2 times daily for 20 doses 20 tablet 0    montelukast (SINGULAIR) 10 MG tablet Take 10 mg by mouth nightly.  montelukast (SINGULAIR) 10 MG tablet Take 1 tablet by mouth nightly. 30 tablet 0     No current facility-administered medications on file prior to visit. ALLERGIES:   Allergies as of 06/17/2022 - Fully Reviewed 06/17/2022   Allergen Reaction Noted    Chantix [varenicline tartrate] Other (See Comments) 11/07/2019    Codeine Shortness Of Breath 05/13/2010    Advair [fluticasone-salmeterol] Hives 05/03/2012    Spiriva [tiotropium bromide monohydrate] Hives 06/04/2012      OBJECTIVE:   weight is 155 lb (70.3 kg). Her blood pressure is 126/72 and her pulse is 87. Her oxygen saturation is 97%. PHYSICAL EXAM:    CONSTITUTIONAL: She is a 55y.o.-year-old who appears her stated age. She is alert and oriented x 3 and in no acute distress. HEENT: PERRL. No scleral icterus. No thrush, atraumatic, normocephalic. NECK: Supple, without cervical or supraclavicular lymphadenopathy:  CARDIOVASCULAR: S1 S2 RRR. Without murmer  RESPIRATORY & CHEST: Minimal expiratory wheezing, no crackles. Good air movement  GASTROINTESTINAL & ABDOMEN: Soft, nontender, positive bowel sounds in all quadrants, non-distended, without hepatosplenomegaly. GENITOURINARY: Deferred. MUSCULOSKELETAL: No tenderness to palpation of the axial skeleton. There is no clubbing. No cyanosis. No edema of the lower extremities. SKIN OF BODY: No rash or jaundice. PSYCHIATRIC EVALUATION: Normal affect. Patient answers questions appropriately. HEMATOLOGIC/LYMPHATIC/ IMMUNOLOGIC: No palpable lymphadenopathy. NEUROLOGIC: Alert and oriented x 3. Groslly non-focal. Motor strength is 5+/5 in all muscle groups. The patient has a normal sensorium globally. ASSESSMENT AND PLAN:     1. Encounter for lung cancer screening  Reviewed with the patient benefits of lung cancer screening which includes an increased chance of finding lung cancer early when it is more treatable. Also discussed the possible risks of screening including the risk of radiation exposure, false alarms, overtreatment, and need for more invasive procedures. Reviewed importance of adherence to annual CT screening and willingness to undergo full diagnostic work up and treatment if indicated. - RI VISIT TO DISCUSS LUNG CA SCREEN W LDCT  - CT Lung Screen (Annual); Future      2. COPD with asthma with acute exasperation ([facility] [address]  Continue finishing the course of prednisone and antibiotics provided by PCP. Continue Symbicort twice daily. Have advised the patient to continue using duo nebs every 3 hours for now and then slowly taper down. Continue albuterol inhaler as needed. - Full PFT Study With Bronchodilator; Future  - ipratropium-albuterol (DUONEB) 0.5-2.5 (3) MG/3ML SOLN nebulizer solution; Take 3 mLs by nebulization every 6 hours as needed for Shortness of Breath  Dispense: 360 mL; Refill: 6      3. Current everyday smoker  Patient counselled on the dangers of tobacco use and urged to quit. Also discussed the importance of a supportive environment and helped identify them. Discussed possibility of various Nicotine replacement therapies if experiencing prolonged craving or withdrawal symptoms. Discussed the possibility of negative mood or depression after quitting. Reassured that some weight gain after smoking is common and dietary, exercise, or lifestyle changes will be able to control it.  Time spent counseling was >10mins. - nicotine polacrilex (NICORETTE) 4 MG gum; Take 1 each by mouth as needed for Smoking cessation (every 2-4 hours as needed)  Dispense: 110 each; Refill: 3  - Nicotine patch      Return in about 4 months (around 10/17/2022) for f/u with Dr Edel King . Uziel Pandey MD  Pulmonary Critical Care and Sleep Medicine  Electronically signed by Uziel Pandey MD on 6/17/2022 at 10:19 AM     This note was completed using dragon medical speech recognition software. Grammatical errors, random word insertions, pronoun errors and incomplete sentences are occasional consequences of this technology due to software limitations. If there are questions or concerns about the content of this note of information contained within the body of this dictation they should be addressed with the provider for clarification.

## 2022-06-17 NOTE — PROGRESS NOTES
Low Dose CT (LDCT) Lung Screening criteria met:     Age 50-77(Medicare) or 50-80 ([facility])   Pack year smoking >20   Still smoking or less than 15 year since quit   No sign or symptoms of lung cancer   > 11 months since last LDCT     Risks and benefits of lung cancer screening with LDCT scans discussed:    Significance of positive screen - False-positive LDCT results often occur. 95% of all positive results do not lead to a diagnosis of cancer. Usually further imaging can resolve most false-positive results; however, some patients may require invasive procedures. Over diagnosis risk - 10% to 12% of screen-detected lung cancer cases are over diagnosed--that is, the cancer would not have been detected in the patient's lifetime without the screening. Need for follow up screens annually to continue lung cancer screening effectiveness     Risks associated with radiation from annual LDCT- Radiation exposure is about the same as for a mammogram, which is about 1/3 of the annual background radiation exposure from everyday life. Starting screening at age 54 is not likely to increase cancer risk from radiation exposure. Patients with comorbidities resulting in life expectancy of < 10 years, or that would preclude treatment of an abnormality identified on CT, should not be screened due to lack of benefit.     To obtain maximal benefit from this screening, smoking cessation and long-term abstinence from smoking is critical

## 2022-07-08 ENCOUNTER — TELEPHONE (OUTPATIENT)
Dept: PULMONOLOGY | Age: 57
End: 2022-07-08

## 2022-07-08 NOTE — TELEPHONE ENCOUNTER
PCP prescribed Paxlovid for pt-tested 3 days ago positive for COVID On day 2 of Paxlovid  Has had a terrible cough(was being treated for infection with Dr Kellie Cortez prior to this)and feels pretty bad fatigued and getting up a lot of phlegm and this has her worried that she may have pneumonia.

## 2022-07-08 NOTE — TELEPHONE ENCOUNTER
Patient symptoms are likely related to COVID infection. She is already on Paxilovid. If she is getting worse then she may need to go to urgent care or ER.

## 2022-08-12 ENCOUNTER — TELEPHONE (OUTPATIENT)
Dept: PULMONOLOGY | Age: 57
End: 2022-08-12

## 2022-08-12 NOTE — TELEPHONE ENCOUNTER
Left message for pt that we can schedule her with Bhavya Guadalupe for 8-18-22 at 10:30 Asked for her to call us back on Monday and let us know if that will work for her.

## 2022-08-12 NOTE — TELEPHONE ENCOUNTER
Patient called and said that she has just gotten over [facility] and was wanting to come in sooner  her apt is scheduled for 10/24/22  and in the apt notes it shows that she was supposed to have done a PFT, CT scan  , and a lung screening done before apt all orders are still active and said her lungs are not strong enough for testing.         Please give her a call at    [facility]: [phone]

## 2022-09-09 ENCOUNTER — HOSPITAL ENCOUNTER (OUTPATIENT)
Dept: CT IMAGING | Age: 57
Discharge: HOME | End: 2022-09-09
Payer: COMMERCIAL

## 2022-09-09 DIAGNOSIS — Z87.891 PERSONAL HISTORY OF TOBACCO USE: ICD-10-CM

## 2022-09-09 PROCEDURE — 71271 CT THORAX LUNG CANCER SCR C-: CPT

## 2022-09-12 ENCOUNTER — TELEPHONE (OUTPATIENT)
Dept: CASE MANAGEMENT | Age: 57
End: 2022-09-12

## 2022-09-12 NOTE — TELEPHONE ENCOUNTER
Baseline lung screen on 9/11/22. LRAD2. Recommended screen in one year. Results letter mailed.  Will follow in the lung screening program.

## 2022-10-24 ENCOUNTER — TELEPHONE (OUTPATIENT)
Dept: PULMONOLOGY | Age: 57
End: 2022-10-24

## 2022-10-24 NOTE — TELEPHONE ENCOUNTER
Called pt, left voice mail for her to call to office back to reschedule. As she was a no show today.

## 2023-02-11 DIAGNOSIS — J44.9 COPD WITH ASTHMA (HCC): ICD-10-CM

## 2023-02-13 RX ORDER — IPRATROPIUM BROMIDE AND ALBUTEROL SULFATE 2.5; .5 MG/3ML; MG/3ML
1 SOLUTION RESPIRATORY (INHALATION) EVERY 6 HOURS PRN
Qty: 360 ML | Refills: 0 | Status: SHIPPED | OUTPATIENT
Start: 2023-02-13

## 2023-08-10 ENCOUNTER — TELEPHONE (OUTPATIENT)
Dept: CASE MANAGEMENT | Age: 58
End: 2023-08-10

## 2023-10-09 ENCOUNTER — TELEPHONE (OUTPATIENT)
Dept: CASE MANAGEMENT | Age: 58
End: 2023-10-09

## 2023-11-08 ENCOUNTER — TELEPHONE (OUTPATIENT)
Dept: CASE MANAGEMENT | Age: 58
End: 2023-11-08